# Patient Record
Sex: FEMALE | URBAN - METROPOLITAN AREA
[De-identification: names, ages, dates, MRNs, and addresses within clinical notes are randomized per-mention and may not be internally consistent; named-entity substitution may affect disease eponyms.]

---

## 2018-06-20 ENCOUNTER — OFFICE VISIT (OUTPATIENT)
Dept: INTERNAL MEDICINE CLINIC | Age: 23
End: 2018-06-20

## 2018-06-20 VITALS
HEART RATE: 65 BPM | HEIGHT: 65 IN | WEIGHT: 159 LBS | DIASTOLIC BLOOD PRESSURE: 68 MMHG | RESPIRATION RATE: 17 BRPM | OXYGEN SATURATION: 100 % | BODY MASS INDEX: 26.49 KG/M2 | TEMPERATURE: 97.2 F | SYSTOLIC BLOOD PRESSURE: 125 MMHG

## 2018-06-20 DIAGNOSIS — F32.A DEPRESSION, UNSPECIFIED DEPRESSION TYPE: ICD-10-CM

## 2018-06-20 DIAGNOSIS — R63.5 WEIGHT GAIN: ICD-10-CM

## 2018-06-20 DIAGNOSIS — L65.9 FALLING HAIR: ICD-10-CM

## 2018-06-20 DIAGNOSIS — R53.83 FATIGUE, UNSPECIFIED TYPE: Primary | ICD-10-CM

## 2018-06-20 PROBLEM — G80.9 CEREBRAL PALSY (HCC): Status: ACTIVE | Noted: 2018-06-20

## 2018-06-20 RX ORDER — CITALOPRAM 20 MG/1
30 TABLET, FILM COATED ORAL DAILY
COMMUNITY

## 2018-06-20 RX ORDER — LEVONORGESTREL AND ETHINYL ESTRADIOL 0.1-0.02MG
KIT ORAL
COMMUNITY

## 2018-06-20 NOTE — PROGRESS NOTES
Subjective:     Chief Complaint   Patient presents with    Alopecia     falling hair    Fatigue    Weight Gain     gained 30 lbs in 1 1/2 years with diet and exercise        She  is a 20y.o. year old female with h/o depression, cerebral palsy who presents today as a new patient to discuss about some concerns. 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 Planning to go back to [place] in toe months. Patient reports that she has noticed gradual weight gain approximately 30 lbs in 1 1/2 years. States that she has been exercising 3-4 times/week and maintaining a low calorie diet but still cant loose the weight. Feels tired and noticing hair falling more than usual which worries her. Period is normal.   States that her maternal aunt was diagnosed with hashimoto followed by thyroid cancer recently. A comprehensive review of systems was negative except for that written in the HPI.   Objective:     Vitals:    06/20/18 0830   BP: 125/68   Pulse: 65   Resp: 17   Temp: 97.2 °F (36.2 °C)   TempSrc: Temporal   SpO2: 100%   Weight: 159 lb (72.1 kg)   Height: 5' 4.5\" (1.638 m)       Physical Examination: General appearance - alert, well appearing, and in no distress, oriented to person, place, and time and normal appearing weight  Mental status - alert, oriented to person, place, and time, normal mood, behavior, speech, dress, motor activity, and thought processes  Eyes - pupils equal and reactive, extraocular eye movements intact  Ears - bilateral TM's and external ear canals normal  Nose - normal and patent, no erythema, discharge or polyps  Mouth - mucous membranes moist, pharynx normal without lesions  Neck - supple, no significant adenopathy, thyroid exam: thyroid is normal in size without nodules or tenderness  Chest - clear to auscultation, no wheezes, rales or rhonchi, symmetric air entry  Heart - normal rate, regular rhythm, normal S1, S2, no murmurs, rubs, clicks or gallops  Neurological - alert, oriented, normal speech. Left sided week ness noted due to her cerebral palsy. Musculoskeletal - no joint tenderness, deformity or swelling    No Known Allergies   Social History     Social History    Marital status: UNKNOWN     Spouse name: N/A    Number of children: N/A    Years of education: N/A     Social History Main Topics    Smoking status: Never Smoker    Smokeless tobacco: Never Used    Alcohol use Yes      Comment: 4    Drug use: No    Sexual activity: Not Asked     Other Topics Concern    None     Social History Narrative    None      Family History   Problem Relation Age of Onset    No Known Problems Mother     No Known Problems Father     Thyroid Disease Maternal Aunt       History reviewed. No pertinent surgical history. Past Medical History:   Diagnosis Date    Cerebral palsy ([facility] [address]     Depression       Current Outpatient Prescriptions   Medication Sig Dispense Refill    levonorgestrel-ethinyl estradiol (LARISSIA) 0.1-20 mg-mcg tab Take  by mouth.  citalopram (CELEXA) 20 mg tablet Take 30 mg by mouth daily. Assessment/ Plan:   Diagnoses and all orders for this visit:    1. Fatigue, unspecified type  -     TSH AND FREE T4  -     CBC WITH AUTOMATED DIFF    2. Falling hair  -     TSH AND FREE T4  -     CBC WITH AUTOMATED DIFF    3. Weight gain  -   Continue exercise but increase to at least 150 minutes/week. Continue low calorie diet.   -    TSH AND FREE T4  -     CBC WITH AUTOMATED DIFF    4. Depression, unspecified depression type        - well controlled with Celexa. Medication risks/benefits/costs/interactions/alternatives discussed with patient. Advised patient to call back or return to office if symptoms worsen/change/persist. If patient cannot reach us or should anything more severe/urgent arise he/she should proceed directly to the nearest emergency department. Discussed expected course/resolution/complications of diagnosis in detail with patient.   Patient given a written after visit summary which includes her diagnoses, current medications and vitals. Patient expressed understanding with the diagnosis and plan.        Follow-up Disposition: Not on File

## 2018-06-20 NOTE — MR AVS SNAPSHOT
[facility] [address] 
[phone] Patient: Grabiel Henry MRN: [id number] :1995 Visit Information Date & Time Provider Department Dept. Phone Encounter #  
 2018  8:30 AM Hank Yeh MD [facility] Internal Medicine [phone] [id number] Allergies as of 2018  Review Complete On: 2018 By: Hank Yeh MD  
 No Known Allergies Current Immunizations  Never Reviewed No immunizations on file. Not reviewed this visit You Were Diagnosed With   
  
 Codes Comments Fatigue, unspecified type    -  Primary ICD-10-CM: R53.83 ICD-9-CM: 780.79 Falling hair     ICD-10-CM: L65.9 ICD-9-CM: 704.00 Weight gain     ICD-10-CM: R63.5 ICD-9-CM: 783.1 Vitals BP Pulse Temp Resp Height(growth percentile) Weight(growth percentile) 125/68 (BP 1 Location: Left arm, BP Patient Position: Sitting) 65 97.2 °F (36.2 °C) (Temporal) 17 5' 4.5\" (1.638 m) 159 lb (72.1 kg) LMP SpO2 BMI OB Status Smoking Status 2018 100% 26.87 kg/m2 Having regular periods Never Smoker Vitals History BMI and BSA Data Body Mass Index Body Surface Area  
 26.87 kg/m 2 1.81 m 2 Preferred Pharmacy Pharmacy Name Phone [phone] Your Updated Medication List  
  
   
This list is accurate as of 18  8:53 AM.  Always use your most recent med list.  
  
  
  
  
 citalopram 20 mg tablet Commonly known as:  Judythe Grade Take 30 mg by mouth daily. LARISSIA 0.1-20 mg-mcg Tab Generic drug:  levonorgestrel-ethinyl estradiol Take  by mouth. We Performed the Following CBC WITH AUTOMATED DIFF [80124 CPT(R)] TSH AND FREE T4 [48858 CPT(R)] Introducing [facility]!    
 [employer] introduces [employer] patient portal. Now you can access parts of your medical record, email your doctor's office, and request medication refills online. 1. In your internet browser, go to [email or web address] 2. Click on the First Time User? Click Here link in the Sign In box. You will see the New Member Sign Up page. 3. Enter your [employer] Access Code exactly as it appears below. You will not need to use this code after youve completed the sign-up process. If you do not sign up before the expiration date, you must request a new code. · [employer] Access Code: [email or web address] Expires: 9/18/2018  8:33 AM 
 
4. Enter the last four digits of your Social Security Number (xxxx) and Date of Birth (mm/dd/yyyy) as indicated and click Submit. You will be taken to the next sign-up page. 5. Create a Jumia ID. This will be your Jumia login ID and cannot be changed, so think of one that is secure and easy to remember. 6. Create a Jumia password. You can change your password at any time. 7. Enter your Password Reset Question and Answer. This can be used at a later time if you forget your password. 8. Enter your e-mail address. You will receive e-mail notification when new information is available in [address] 9. Click Sign Up. You can now view and download portions of your medical record. 10. Click the Download Summary menu link to download a portable copy of your medical information. If you have questions, please visit the Frequently Asked Questions section of the [employer] website. Remember, [employer]ia is NOT to be used for urgent needs. For medical emergencies, dial 911. Now available from your iPhone and Android! Please provide this summary of care documentation to your next provider. If you have any questions after today's visit, please call [phone].

## 2018-06-21 ENCOUNTER — TELEPHONE (OUTPATIENT)
Dept: INTERNAL MEDICINE CLINIC | Age: 23
End: 2018-06-21

## 2018-06-21 LAB
BASOPHILS # BLD AUTO: 0 X10E3/UL (ref 0–0.2)
BASOPHILS NFR BLD AUTO: 0 %
EOSINOPHIL # BLD AUTO: 0.1 X10E3/UL (ref 0–0.4)
EOSINOPHIL NFR BLD AUTO: 3 %
ERYTHROCYTE [DISTWIDTH] IN BLOOD BY AUTOMATED COUNT: 14.2 % (ref 12.3–15.4)
HCT VFR BLD AUTO: 43.9 % (ref 34–46.6)
HGB BLD-MCNC: 14.3 G/DL (ref 11.1–15.9)
IMM GRANULOCYTES # BLD: 0 X10E3/UL (ref 0–0.1)
IMM GRANULOCYTES NFR BLD: 0 %
LYMPHOCYTES # BLD AUTO: 1.6 X10E3/UL (ref 0.7–3.1)
LYMPHOCYTES NFR BLD AUTO: 42 %
MCH RBC QN AUTO: 29.5 PG (ref 26.6–33)
MCHC RBC AUTO-ENTMCNC: 32.6 G/DL (ref 31.5–35.7)
MCV RBC AUTO: 91 FL (ref 79–97)
MONOCYTES # BLD AUTO: 0.2 X10E3/UL (ref 0.1–0.9)
MONOCYTES NFR BLD AUTO: 6 %
NEUTROPHILS # BLD AUTO: 1.9 X10E3/UL (ref 1.4–7)
NEUTROPHILS NFR BLD AUTO: 49 %
PLATELET # BLD AUTO: 274 X10E3/UL (ref 150–379)
RBC # BLD AUTO: 4.85 X10E6/UL (ref 3.77–5.28)
T4 FREE SERPL-MCNC: 1.34 NG/DL (ref 0.82–1.77)
TSH SERPL DL<=0.005 MIU/L-ACNC: 1.4 UIU/ML (ref 0.45–4.5)
WBC # BLD AUTO: 3.8 X10E3/UL (ref 3.4–10.8)

## 2018-06-21 NOTE — TELEPHONE ENCOUNTER
----- Message from Malgorzata Angulo MD sent at 6/21/2018  9:17 AM EDT -----  Please call her to inform her that hemoglobin level, thyroid level is normal.

## 2022-03-19 PROBLEM — G80.9 CEREBRAL PALSY (HCC): Status: ACTIVE | Noted: 2018-06-20

## 2023-05-25 RX ORDER — CITALOPRAM 20 MG/1
30 TABLET ORAL DAILY
COMMUNITY

## 2023-05-25 RX ORDER — LEVONORGESTREL AND ETHINYL ESTRADIOL 0.1-0.02MG
KIT ORAL
COMMUNITY